# Patient Record
Sex: MALE | Race: WHITE | NOT HISPANIC OR LATINO | Employment: OTHER | URBAN - METROPOLITAN AREA
[De-identification: names, ages, dates, MRNs, and addresses within clinical notes are randomized per-mention and may not be internally consistent; named-entity substitution may affect disease eponyms.]

---

## 2023-07-18 NOTE — H&P (VIEW-ONLY)
Chief Complaint  Congestive Heart Failure    Subjective            Adeel Pruitt presents to [facility] CARDIOLOGY  Congestive Heart Failure  Associated symptoms include chest pain and shortness of breath.     63-year-old white male.  He has a history of cardiomyopathy, he is somewhat of a difficult historian but it sounds like it was diagnosed a few years ago.  He had some early diagnostic work-up in [place].  His ejection fraction was 20 to 25%.  He is only on a beta-blocker and Lasix.  He has had increased shortness of breath and some tightness in his chest with exertion.  Based on the records I have and discussing with the patient he has never had coronary angiography.  He was a former heavy drinker long-term and has been a heavy smoker long-term.    PMH  Past Medical History:   Diagnosis Date    CHF (congestive heart failure)     Hypertension          SURGICALHX  History reviewed. No pertinent surgical history.     SOC  Social History     Socioeconomic History    Marital status: Single   Tobacco Use    Smoking status: Every Day     Types: Cigarettes    Smokeless tobacco: Current     Types: Chew   Vaping Use    Vaping Use: Never used   Substance and Sexual Activity    Alcohol use: Yes     Comment: social    Drug use: Yes     Types: Marijuana, Cocaine(coke)    Sexual activity: Defer         FAMHX  Family History   Problem Relation Age of Onset    No Known Problems Mother     Hypertension Father           ALLERGY  No Known Allergies     MEDSCURRENT    Current Outpatient Medications:     cephalexin (KEFLEX) 500 MG capsule, Take 1 capsule by mouth 2 (Two) Times a Day., Disp: , Rfl:     doxycycline (VIBRAMYCIN) 100 MG capsule, Take 1 capsule by mouth Daily., Disp: , Rfl:     furosemide (LASIX) 40 MG tablet, Take 1 tablet by mouth 2 (Two) Times a Day., Disp: , Rfl:     metoprolol succinate XL (TOPROL-XL) 100 MG 24 hr tablet, Take 1 tablet by mouth Daily. 1/2 tab, Disp: , Rfl:     naproxen (NAPROSYN)  "500 MG tablet, Take 1 tablet by mouth 2 (Two) Times a Day With Meals., Disp: , Rfl:     sulfamethoxazole-trimethoprim (BACTRIM DS,SEPTRA DS) 800-160 MG per tablet, Take 1 tablet by mouth 2 (Two) Times a Day., Disp: , Rfl:     aspirin 81 MG EC tablet, Take 1 tablet by mouth Daily., Disp: 99 tablet, Rfl: 99    sacubitril-valsartan (Entresto) 24-26 MG tablet, Take 1 tablet by mouth 2 (Two) Times a Day., Disp: 180 tablet, Rfl: 3      Review of Systems   Constitutional: Positive for malaise/fatigue.   HENT: Negative.     Eyes: Negative.    Cardiovascular:  Positive for chest pain and dyspnea on exertion.   Respiratory:  Positive for shortness of breath.    Endocrine: Negative.    Hematologic/Lymphatic: Negative.    Skin: Negative.    Musculoskeletal: Negative.    Gastrointestinal: Negative.    Genitourinary: Negative.    Neurological: Negative.    Psychiatric/Behavioral: Negative.        Objective     /84   Pulse 79   Ht 172.7 cm (68\")   Wt 118 kg (260 lb)   BMI 39.53 kg/m²       General Appearance:   well developed  well nourished, obese  HENT:   oropharynx moist  lips not cyanotic  Neck:  thyroid not enlarged  supple  Respiratory:  no respiratory distress  normal breath sounds  no rales  Cardiovascular:  no jugular venous distention  regular rhythm  apical impulse normal  S1 normal, S2 normal  no S3, no S4   no murmur  no rub, no thrill  carotid pulses normal; no bruit  pedal pulses normal  lower extremity edema: none    Musculoskeletal:  no clubbing of fingers.   normocephalic, head atraumatic  Skin:   warm, dry  Psychiatric:  judgement and insight appropriate  normal mood and affect      Result Review :             Data reviewed : Previous cardiology records reviewed, echo reviewed last month ejection fraction 20 to 25% with mild mitral regurgitation, creatinine 0.9        ECG 12 Lead    Date/Time: 7/18/2023 3:51 PM  Performed by: CHRISTOPHER Conner MD  Authorized by: CHRISTOPHER Conner MD   Comparison: not " compared with previous ECG   Previous ECG: no previous ECG available  Rhythm: sinus rhythm  Conduction: left bundle branch block  ST Segments: ST segments normal  T Waves: T waves normal  QRS axis: normal  Other: no other findings    Clinical impression: abnormal EKG          Adeel Pruitt  reports that he has been smoking cigarettes. His smokeless tobacco use includes chew.. I have educated him on the risk of diseases from using tobacco products such as cancer, COPD, and heart disease.     I advised him to quit and he is willing to quit. We have discussed the following method/s for tobacco cessation:  Counseling.  Together we have set a quit date for  his down to 0 cigarettes .  He will follow up with me in several months or sooner to check on his progress.    I spent 3  minutes counseling the patient.                Assessment and Plan        ASSESSMENT:  Encounter Diagnoses   Name Primary?    Severe left ventricular systolic dysfunction (LVSD) Yes    Hypertension, essential     Morbid (severe) obesity due to excess calories     Smoker          PLAN:    1.  Severe left ventricular dysfunction-etiology is unclear.  Suspect probably nonischemic cardiomyopathy given the chronicity but the patient has never had coronary angiography as best I can tell.  Continue the beta-blocker.  He is symptomatic.  I am adding low-dose Entresto and this will be titrated accordingly.  Eventually I would prefer to get him on spironolactone as well as an SGLT2 inhibitor.  Cardiac catheterization will be scheduled next week and office follow-up thereafter.  His baseline EKG shows a left bundle branch block, once we optimize his medical therapy and reassess his ejection fraction will consider a CRT-D implant long-term with further follow-up and discussion with the patient  2.  Essential hypertension-stable, adding Entresto today  3.  Morbid obesity due to excess calories-counseled  4.  Smoker-counseled    Follow-up will be arranged after  angiography          Patient was given instructions and counseling regarding his condition or for health maintenance advice. Please see specific information pulled into the AVS if appropriate.             C Jude Conner MD  7/18/2023    15:50 EDT

## 2023-07-19 PROBLEM — I51.9 SEVERE LEFT VENTRICULAR SYSTOLIC DYSFUNCTION (LVSD): Status: ACTIVE | Noted: 2023-07-19

## 2023-07-24 NOTE — PRE-PROCEDURE INSTRUCTIONS
PATIENT INSTRUCTED TO BE:    - NPO AFTER MIDNIGHT EXCEPT CAN HAVE SIPS OF WATER WITH HIS MEDICATION PRIOR TO PROCEDURE NO CHEWING AFTER MIDNIGHT    -  INSTRUCTED NO LOTIONS, JEWELRY, PIERCINGS, OR DEODORANT DAY OF THE PROCEDURE    - INSTRUCTED TO TAKE THE FOLLOWING MEDICATIONS THE DAY OF SURGERY:     ASA, FUROSEMIDE, METOPROLOL, ENTRESTO      - INSTRUCTED PT TO FOLLOW ANY INSTRUCTIONS GIVEN BY HIS CARDIOLOGIST.    - INFORMED PT THEY ATTEMPT RADIAL APPROACH FIRST IF UNABLE TO PERFORM CATH WITH THAT APPROACH THEY WILL DO A FEMORAL APPROACH.  ALSO, INFORMED PT THEY WILL BE ON BEDREST POSTOP.  IF THE SURGEON FEELS  AN INTERVENTION OR STENTS NEEDS TO BE PLACED, HE/SHE WILL STAY OVER NIGHT FOR OBSERVATION AND MONITORING.     - INFORMED THE PATIENT HE CAN HAVE TWO VISITORS WITH HIM THE DAY OF THE PROCEDURE    - INSTRUCTED PT TO PARK IN THE PARKING GARAGE, ENTER THE HOSPITAL THRU ENTRANCE A AND  CHECK IN AT THE MAIN REGISTRATION DESK, AFTER REGISTRATION PT WILL BE TAKEN THE THE PREOP AREA FOR HIS PROCEDURE.    -ARRIVAL TIME GIVEN BY CARDIOLOGIST OFFICE, INFORMED PT IF ARRIVAL TIME CHANGES OR ADJUSTMENTS NEED TO BE MADE IN THEIR ARRIVAL TIME, HE/SHE WOULD RECEIVE A CALL.      - PATIENT VERBALIZED UNDERSTANDING    NO SMOKING 24 HOURS PRIOR TO PROCEDURE

## 2023-07-27 ENCOUNTER — HOSPITAL ENCOUNTER (OUTPATIENT)
Facility: HOSPITAL | Age: 63
Setting detail: HOSPITAL OUTPATIENT SURGERY
Discharge: HOME | End: 2023-07-27
Attending: INTERNAL MEDICINE | Admitting: INTERNAL MEDICINE
Payer: COMMERCIAL

## 2023-07-27 VITALS
RESPIRATION RATE: 16 BRPM | WEIGHT: 260 LBS | HEIGHT: 68 IN | TEMPERATURE: 98.4 F | BODY MASS INDEX: 39.4 KG/M2 | HEART RATE: 74 BPM | DIASTOLIC BLOOD PRESSURE: 87 MMHG | OXYGEN SATURATION: 96 % | SYSTOLIC BLOOD PRESSURE: 122 MMHG

## 2023-07-27 DIAGNOSIS — I51.9 SEVERE LEFT VENTRICULAR SYSTOLIC DYSFUNCTION (LVSD): ICD-10-CM

## 2023-07-27 LAB
ANION GAP SERPL CALCULATED.3IONS-SCNC: 9.6 MMOL/L (ref 5–15)
BUN SERPL-MCNC: 13 MG/DL (ref 8–23)
BUN/CREAT SERPL: 12.3 (ref 7–25)
CALCIUM SPEC-SCNC: 8.9 MG/DL (ref 8.6–10.5)
CHLORIDE SERPL-SCNC: 99 MMOL/L (ref 98–107)
CO2 SERPL-SCNC: 29.4 MMOL/L (ref 22–29)
CREAT SERPL-MCNC: 1.06 MG/DL (ref 0.76–1.27)
DEPRECATED RDW RBC AUTO: 45.2 FL (ref 37–54)
EGFRCR SERPLBLD CKD-EPI 2021: 79.8 ML/MIN/1.73
ERYTHROCYTE [DISTWIDTH] IN BLOOD BY AUTOMATED COUNT: 14.6 % (ref 12.3–15.4)
GLUCOSE SERPL-MCNC: 113 MG/DL (ref 65–99)
HCT VFR BLD AUTO: 44.1 % (ref 37.5–51)
HGB BLD-MCNC: 14.7 G/DL (ref 13–17.7)
INR PPP: 0.93 (ref 0.86–1.15)
MCH RBC QN AUTO: 29.3 PG (ref 26.6–33)
MCHC RBC AUTO-ENTMCNC: 33.3 G/DL (ref 31.5–35.7)
MCV RBC AUTO: 88 FL (ref 79–97)
PLATELET # BLD AUTO: 272 10*3/MM3 (ref 140–450)
PMV BLD AUTO: 9.2 FL (ref 6–12)
POTASSIUM SERPL-SCNC: 4.1 MMOL/L (ref 3.5–5.2)
PROTHROMBIN TIME: 12.6 SECONDS (ref 11.8–14.9)
RBC # BLD AUTO: 5.01 10*6/MM3 (ref 4.14–5.8)
SODIUM SERPL-SCNC: 138 MMOL/L (ref 136–145)
WBC NRBC COR # BLD: 9.34 10*3/MM3 (ref 3.4–10.8)

## 2023-07-27 PROCEDURE — 25010000002 FENTANYL CITRATE (PF) 50 MCG/ML SOLUTION: Performed by: INTERNAL MEDICINE

## 2023-07-27 PROCEDURE — C1769 GUIDE WIRE: HCPCS | Performed by: INTERNAL MEDICINE

## 2023-07-27 PROCEDURE — 25510000001 IOPAMIDOL PER 1 ML: Performed by: INTERNAL MEDICINE

## 2023-07-27 PROCEDURE — 93458 L HRT ARTERY/VENTRICLE ANGIO: CPT | Performed by: INTERNAL MEDICINE

## 2023-07-27 PROCEDURE — 99152 MOD SED SAME PHYS/QHP 5/>YRS: CPT | Performed by: INTERNAL MEDICINE

## 2023-07-27 PROCEDURE — 25010000002 MIDAZOLAM PER 1MG: Performed by: INTERNAL MEDICINE

## 2023-07-27 PROCEDURE — 25010000002 HEPARIN (PORCINE) PER 1000 UNITS: Performed by: INTERNAL MEDICINE

## 2023-07-27 PROCEDURE — 85610 PROTHROMBIN TIME: CPT | Performed by: INTERNAL MEDICINE

## 2023-07-27 PROCEDURE — 85027 COMPLETE CBC AUTOMATED: CPT | Performed by: INTERNAL MEDICINE

## 2023-07-27 PROCEDURE — C1894 INTRO/SHEATH, NON-LASER: HCPCS | Performed by: INTERNAL MEDICINE

## 2023-07-27 PROCEDURE — 80048 BASIC METABOLIC PNL TOTAL CA: CPT | Performed by: INTERNAL MEDICINE

## 2023-07-27 RX ORDER — MIDAZOLAM HYDROCHLORIDE 2 MG/2ML
INJECTION, SOLUTION INTRAMUSCULAR; INTRAVENOUS
Status: DISCONTINUED | OUTPATIENT
Start: 2023-07-27 | End: 2023-07-27 | Stop reason: HOSPADM

## 2023-07-27 RX ORDER — ACETAMINOPHEN 325 MG/1
650 TABLET ORAL EVERY 4 HOURS PRN
Status: DISCONTINUED | OUTPATIENT
Start: 2023-07-27 | End: 2023-07-27 | Stop reason: HOSPADM

## 2023-07-27 RX ORDER — NITROGLYCERIN 0.4 MG/1
0.4 TABLET SUBLINGUAL
Status: DISCONTINUED | OUTPATIENT
Start: 2023-07-27 | End: 2023-07-27 | Stop reason: HOSPADM

## 2023-07-27 RX ORDER — VERAPAMIL HYDROCHLORIDE 2.5 MG/ML
INJECTION, SOLUTION INTRAVENOUS
Status: DISCONTINUED | OUTPATIENT
Start: 2023-07-27 | End: 2023-07-27 | Stop reason: HOSPADM

## 2023-07-27 RX ORDER — SODIUM CHLORIDE 9 MG/ML
40 INJECTION, SOLUTION INTRAVENOUS AS NEEDED
Status: DISCONTINUED | OUTPATIENT
Start: 2023-07-27 | End: 2023-07-27 | Stop reason: HOSPADM

## 2023-07-27 RX ORDER — LIDOCAINE HYDROCHLORIDE 20 MG/ML
INJECTION, SOLUTION INFILTRATION; PERINEURAL
Status: DISCONTINUED | OUTPATIENT
Start: 2023-07-27 | End: 2023-07-27 | Stop reason: HOSPADM

## 2023-07-27 RX ORDER — FENTANYL CITRATE 50 UG/ML
INJECTION, SOLUTION INTRAMUSCULAR; INTRAVENOUS
Status: DISCONTINUED | OUTPATIENT
Start: 2023-07-27 | End: 2023-07-27 | Stop reason: HOSPADM

## 2023-07-27 RX ORDER — HEPARIN SODIUM 1000 [USP'U]/ML
INJECTION, SOLUTION INTRAVENOUS; SUBCUTANEOUS
Status: DISCONTINUED | OUTPATIENT
Start: 2023-07-27 | End: 2023-07-27 | Stop reason: HOSPADM

## 2023-07-27 RX ORDER — SODIUM CHLORIDE 0.9 % (FLUSH) 0.9 %
10 SYRINGE (ML) INJECTION EVERY 12 HOURS SCHEDULED
Status: DISCONTINUED | OUTPATIENT
Start: 2023-07-27 | End: 2023-07-27 | Stop reason: HOSPADM

## 2023-07-27 RX ORDER — SODIUM CHLORIDE 0.9 % (FLUSH) 0.9 %
10 SYRINGE (ML) INJECTION AS NEEDED
Status: DISCONTINUED | OUTPATIENT
Start: 2023-07-27 | End: 2023-07-27 | Stop reason: HOSPADM

## 2023-07-27 RX ORDER — SACUBITRIL AND VALSARTAN 24; 26 MG/1; MG/1
2 TABLET, FILM COATED ORAL 2 TIMES DAILY
Qty: 180 TABLET | Refills: 3 | Status: SHIPPED | OUTPATIENT
Start: 2023-07-27

## 2023-07-27 NOTE — NURSING NOTE
Patient came back from procedure stable. Patient was recovered per order. No signs of bleeding or swelling at procedure site. VSS. Patient and spouse educated on discharge instructions. Patient and spouse verbalized understanding. IV removed.

## 2023-08-09 ENCOUNTER — OFFICE VISIT (OUTPATIENT)
Dept: CARDIOLOGY | Facility: CLINIC | Age: 63
End: 2023-08-09
Payer: COMMERCIAL

## 2023-08-09 VITALS
DIASTOLIC BLOOD PRESSURE: 92 MMHG | HEART RATE: 93 BPM | HEIGHT: 68 IN | BODY MASS INDEX: 39.4 KG/M2 | WEIGHT: 260 LBS | SYSTOLIC BLOOD PRESSURE: 132 MMHG

## 2023-08-09 DIAGNOSIS — F17.200 SMOKER: ICD-10-CM

## 2023-08-09 DIAGNOSIS — E66.01 MORBID (SEVERE) OBESITY DUE TO EXCESS CALORIES: ICD-10-CM

## 2023-08-09 DIAGNOSIS — I10 HYPERTENSION, ESSENTIAL: ICD-10-CM

## 2023-08-09 DIAGNOSIS — I51.9 SEVERE LEFT VENTRICULAR SYSTOLIC DYSFUNCTION (LVSD): ICD-10-CM

## 2023-08-09 DIAGNOSIS — I42.0 CARDIOMYOPATHY, DILATED: Primary | ICD-10-CM

## 2023-08-09 RX ORDER — FUROSEMIDE 40 MG/1
40 TABLET ORAL 2 TIMES DAILY
Qty: 180 TABLET | Refills: 3 | Status: SHIPPED | OUTPATIENT
Start: 2023-08-09

## 2023-08-09 RX ORDER — METOPROLOL SUCCINATE 100 MG/1
100 TABLET, EXTENDED RELEASE ORAL DAILY
Qty: 90 TABLET | Refills: 3 | Status: SHIPPED | OUTPATIENT
Start: 2023-08-09

## 2023-08-09 RX ORDER — SPIRONOLACTONE 25 MG/1
25 TABLET ORAL DAILY
Qty: 90 TABLET | Refills: 3 | Status: SHIPPED | OUTPATIENT
Start: 2023-08-09

## 2023-08-09 NOTE — PROGRESS NOTES
Chief Complaint  severe left ventricular systolic dysfunction    Sherie Pruitt presents to [facility] CARDIOLOGY  History of Present Illness    Mr. Pruitt is here for follow-up evaluation management of dilated cardiomyopathy, essential hypertension, dyspnea, smoker.  His ejection fraction was recently 20 to 25%.  Subsequent cardiac catheterization showed minimal nonobstructive coronary artery disease, severe dilated cardiomyopathy ejection fraction 20%.  He denies chest pain.  He has shortness of breath with exertion which is stable as well as lower extremity edema.  He denies fluid weight gain.  He is compliant with his medical therapy.    PMH  Past Medical History:   Diagnosis Date    Cardiomyopathy     CHF (congestive heart failure)     [place] (hard of hearing)     Hypertension     Shortness of breath     REPORTS ISSUES AT REST AT TIMES AND WITH EXERTION         SURGICALHX  Past Surgical History:   Procedure Laterality Date    ANKLE SURGERY Left     HARDWARE PLACED    CARDIAC CATHETERIZATION Left 7/27/2023    Procedure: Left Heart Cath - radial;  Surgeon: CHRISTOPHER Conner MD;  Location: [facility] CATH INVASIVE LOCATION;  Service: Cardiology;  Laterality: Left;        SOC  Social History     Socioeconomic History    Marital status: Single   Tobacco Use    Smoking status: Every Day     Types: Cigarettes    Smokeless tobacco: Current     Types: Chew   Vaping Use    Vaping Use: Never used   Substance and Sexual Activity    Alcohol use: Yes     Comment: social    Drug use: Yes     Types: Marijuana, Cocaine(coke)     Comment: REPORTS HAS BEEN MONTH SINCE USED COCAINE, LAST MARIJUANA COUPLE OF DAYS AGO    Sexual activity: Defer         FAMHX  Family History   Problem Relation Age of Onset    No Known Problems Mother     Hypertension Father           ALLERGY  No Known Allergies     MEDSCURRENT    Current Outpatient Medications:     aspirin 81 MG EC tablet, Take 1 tablet by mouth Daily.,  "Disp: 99 tablet, Rfl: 99    furosemide (LASIX) 40 MG tablet, Take 1 tablet by mouth 2 (Two) Times a Day., Disp: 180 tablet, Rfl: 3    metoprolol succinate XL (TOPROL-XL) 100 MG 24 hr tablet, Take 1 tablet by mouth Daily., Disp: 90 tablet, Rfl: 3    sacubitril-valsartan (Entresto) 24-26 MG tablet, Take 2 tablets by mouth 2 (Two) Times a Day., Disp: 180 tablet, Rfl: 3    spironolactone (ALDACTONE) 25 MG tablet, Take 1 tablet by mouth Daily., Disp: 90 tablet, Rfl: 3      Review of Systems   Constitutional: Negative for malaise/fatigue.   Cardiovascular:  Positive for dyspnea on exertion and leg swelling. Negative for chest pain and palpitations.   Respiratory:  Positive for shortness of breath.    Neurological:  Negative for dizziness.      Objective     /92   Pulse 93   Ht 172.7 cm (68\")   Wt 118 kg (260 lb)   BMI 39.53 kg/mý       General Appearance:   well developed  well nourished  HENT:   oropharynx moist  lips not cyanotic  Neck:  thyroid not enlarged  supple  Respiratory:  no respiratory distress  normal breath sounds  no rales  Cardiovascular:  no jugular venous distention  regular rhythm  apical impulse normal  S1 normal, S2 normal  no S3, no S4   no murmur  no rub, no thrill  carotid pulses normal; no bruit  pedal pulses normal  lower extremity edema: Trace  Musculoskeletal:  no clubbing of fingers.   normocephalic, head atraumatic  Skin:   warm, dry  Psychiatric:  judgement and insight appropriate  normal mood and affect      Result Review :     The following data was reviewed by: RIMMA Terrazas on 08/09/2023:    CMP          7/27/2023    10:36   CMP   Glucose 113    BUN 13    Creatinine 1.06    EGFR 79.8    Sodium 138    Potassium 4.1    Chloride 99    Calcium 8.9    BUN/Creatinine Ratio 12.3    Anion Gap 9.6      CBC          7/27/2023    10:01   CBC   WBC 9.34    RBC 5.01    Hemoglobin 14.7    Hematocrit 44.1    MCV 88.0    MCH 29.3    MCHC 33.3    RDW 14.6    Platelets 272  "                Procedures      Adeel Pruitt  reports that he has been smoking cigarettes. His smokeless tobacco use includes chew.. I have educated him on the risk of diseases from using tobacco products such as cancer, COPD, and heart disease.     I advised him to quit and he is not willing to quit.    I spent 3  minutes counseling the patient.                Assessment and Plan        ASSESSMENT:  Encounter Diagnoses   Name Primary?    Severe left ventricular systolic dysfunction (LVSD)     Hypertension, essential     Morbid (severe) obesity due to excess calories     Smoker     Cardiomyopathy, dilated Yes         PLAN:    1.  Dilated cardiomyopathy-ejection fraction severely reduced at 20%.  Coronary angiography showed minimal nonobstructive CAD.  Concerning GDMT.  Continue Entresto.  Increase Toprol.  Add Aldactone.  BMP in 2 weeks.  Volume status is stable.  2.  Essential hypertension-mildly elevated today.  Adjustments as above.  3.  Smoker-counseled as above.    Follow-up in 3 weeks for further GDMT adjustments.  We will maximize medical therapy and then repeat an echocardiogram around 3 months.  At that point if ejection fraction remains severely reduced will consider biventricular ICD implantation.  He is agreeable to this plan.      Patient was given instructions and counseling regarding his condition or for health maintenance advice. Please see specific information pulled into the AVS if appropriate.           Adriana Farr, APRN   8/9/2023  13:12 EDT

## 2023-09-11 ENCOUNTER — OFFICE VISIT (OUTPATIENT)
Dept: CARDIOLOGY | Facility: CLINIC | Age: 63
End: 2023-09-11
Payer: COMMERCIAL

## 2023-09-11 VITALS
WEIGHT: 260 LBS | BODY MASS INDEX: 39.4 KG/M2 | DIASTOLIC BLOOD PRESSURE: 92 MMHG | HEART RATE: 104 BPM | SYSTOLIC BLOOD PRESSURE: 134 MMHG | HEIGHT: 68 IN

## 2023-09-11 DIAGNOSIS — F17.200 SMOKER: ICD-10-CM

## 2023-09-11 DIAGNOSIS — I42.0 CARDIOMYOPATHY, DILATED: Primary | ICD-10-CM

## 2023-09-11 DIAGNOSIS — I10 HYPERTENSION, ESSENTIAL: ICD-10-CM

## 2023-09-11 PROCEDURE — 1159F MED LIST DOCD IN RCRD: CPT | Performed by: NURSE PRACTITIONER

## 2023-09-11 PROCEDURE — 99214 OFFICE O/P EST MOD 30 MIN: CPT | Performed by: NURSE PRACTITIONER

## 2023-09-11 PROCEDURE — 1160F RVW MEDS BY RX/DR IN RCRD: CPT | Performed by: NURSE PRACTITIONER

## 2023-09-11 NOTE — PROGRESS NOTES
Chief Complaint  Cardiomyopathy    Subjective            Adeel Pruitt presents to [facility] CARDIOLOGY  History of Present Illness    Mr. Pruitt is here for follow-up evaluation management of dilated cardiomyopathy, essential hypertension, dyspnea, and smoking.  Ejection fraction 20 to 25%.  Subsequent cardiac catheterization showed minimal nonobstructive coronary artery disease, severe dilated cardiomyopathy, ejection fraction 20%.  He is compliant with guideline directed medical therapy.  He denies chest pain or excessive shortness of breath.  Lower extremity edema is stable.  Compliant medical therapy.    PMH  Past Medical History:   Diagnosis Date    Cardiomyopathy     CHF (congestive heart failure)     [place] (hard of hearing)     Hypertension     Shortness of breath     REPORTS ISSUES AT REST AT TIMES AND WITH EXERTION         SURGICALHX  Past Surgical History:   Procedure Laterality Date    ANKLE SURGERY Left     HARDWARE PLACED    CARDIAC CATHETERIZATION Left 7/27/2023    Procedure: Left Heart Cath - radial;  Surgeon: CHRISTOPHER Conner MD;  Location[facility] CATH INVASIVE LOC[facility];  Service: Cardiology;  Laterality: Left;        SOC  Social History     Socioeconomic History    Marital status: Single   Tobacco Use    Smoking status: Every Day     Types: Cigarettes    Smokeless tobacco: Current     Types: Chew   Vaping Use    Vaping Use: Never used   Substance and Sexual Activity    Alcohol use: Yes     Comment: social    Drug use: Yes     Types: Marijuana, Cocaine(coke)     Comment: REPORTS HAS BEEN MONTH SINCE USED COCAINE, LAST MARIJUANA COUPLE OF DAYS AGO    Sexual activity: Defer         FAMHX  Family History   Problem Relation Age of Onset    No Known Problems Mother     Hypertension Father           ALLERGY  No Known Allergies     MEDSCURRENT    Current Outpatient Medications:     aspirin 81 MG EC tablet, Take 1 tablet by mouth Daily., Disp: 99 tablet, Rfl: 99    furosemide (LASIX) 40 MG  "tablet, Take 1 tablet by mouth 2 (Two) Times a Day., Disp: 180 tablet, Rfl: 3    metoprolol succinate XL (TOPROL-XL) 100 MG 24 hr tablet, Take 1 tablet by mouth Daily., Disp: 90 tablet, Rfl: 3    spironolactone (ALDACTONE) 25 MG tablet, Take 1 tablet by mouth Daily., Disp: 90 tablet, Rfl: 3    dapagliflozin Propanediol 10 MG tablet, Take 10 mg by mouth Daily., Disp: 90 tablet, Rfl: 3    sacubitril-valsartan (ENTRESTO) 49-51 MG tablet, Take 1 tablet by mouth 2 (Two) Times a Day., Disp: 180 tablet, Rfl: 3      Review of Systems   Constitutional: Negative for malaise/fatigue.   Cardiovascular:  Positive for leg swelling and palpitations. Negative for chest pain, dyspnea on exertion, irregular heartbeat, near-syncope, orthopnea and syncope.   Respiratory:  Positive for shortness of breath.    Neurological:  Negative for dizziness.      Objective     /92   Pulse 104   Ht 172.7 cm (68\")   Wt 118 kg (260 lb)   BMI 39.53 kg/m²       General Appearance:   well developed  well nourished  HENT:   oropharynx moist  lips not cyanotic  Neck:  thyroid not enlarged  supple  Respiratory:  no respiratory distress  normal breath sounds  no rales  Cardiovascular:  no jugular venous distention  regular rhythm  apical impulse normal  S1 normal, S2 normal  no S3, no S4   no murmur  no rub, no thrill  carotid pulses normal; no bruit  pedal pulses normal  lower extremity edema: 1+  Musculoskeletal:  no clubbing of fingers.   normocephalic, head atraumatic  Skin:   warm, dry  Psychiatric:  judgement and insight appropriate  normal mood and affect      Result Review :     The following data was reviewed by: RIMMA Terrazas on 09/11/2023:    CMP          7/27/2023    10:36   CMP   Glucose 113    BUN 13    Creatinine 1.06    EGFR 79.8    Sodium 138    Potassium 4.1    Chloride 99    Calcium 8.9    BUN/Creatinine Ratio 12.3    Anion Gap 9.6      CBC          7/27/2023    10:01   CBC   WBC 9.34    RBC 5.01    Hemoglobin 14.7 "    Hematocrit 44.1    MCV 88.0    MCH 29.3    MCHC 33.3    RDW 14.6    Platelets 272            Data reviewed : Cardiology studies as above      Procedures      Adeel Pruitt  reports that he has been smoking cigarettes. His smokeless tobacco use includes chew.. I have educated him on the risk of diseases from using tobacco products such as cancer, COPD, and heart disease.     I advised him to quit and he is not willing to quit.    I spent 3  minutes counseling the patient.                Assessment and Plan        ASSESSMENT:  Encounter Diagnoses   Name Primary?    Cardiomyopathy, dilated Yes    Hypertension, essential     Smoker          PLAN:    1.  Dilated cardiomyopathy-ejection fraction most recently 20%.  Coronary catheterization showed minimal nonobstructive disease.  Increase Entresto to mid dose.  Continue Toprol and Aldactone.  He did not get blood work done after I last saw him, I have reordered a BMP today.  Also will add on Farxiga today.  Volume status is stable.  2.  Essential hypertension-mildly elevated today.  Increasing Entresto.  3.  Smoker-cessation counseling as above.    Follow-up in 6 weeks unless problems arise.  We will maximize guideline directed medical therapy and repeat an echocardiogram 3 months afterwards to follow-up on LV function.      Patient was given instructions and counseling regarding his condition or for health maintenance advice. Please see specific information pulled into the AVS if appropriate.           Adriana Farr, APRN   9/11/2023  11:53 EDT

## 2023-09-27 ENCOUNTER — TELEPHONE (OUTPATIENT)
Dept: CARDIOLOGY | Facility: CLINIC | Age: 63
End: 2023-09-27
Payer: COMMERCIAL

## 2023-09-28 ENCOUNTER — TELEPHONE (OUTPATIENT)
Dept: CARDIOLOGY | Facility: CLINIC | Age: 63
End: 2023-09-28

## 2023-09-28 NOTE — TELEPHONE ENCOUNTER
[facility] received a fax that requires your attention. The document has been indexed to the patient’s chart for your review.      Reason for sending: EXTERNAL MEDICAL RECORD NOTIFICATION     Documents Description: [facility] UPDATED ABHIJEET NEEDED-9.28.23    Name of Sender: [facility]     Date Indexed: 9.28.23

## 2023-11-15 ENCOUNTER — OFFICE VISIT (OUTPATIENT)
Dept: CARDIOLOGY | Facility: CLINIC | Age: 63
End: 2023-11-15
Payer: COMMERCIAL

## 2023-11-15 VITALS
WEIGHT: 260 LBS | DIASTOLIC BLOOD PRESSURE: 73 MMHG | BODY MASS INDEX: 39.4 KG/M2 | HEART RATE: 80 BPM | SYSTOLIC BLOOD PRESSURE: 127 MMHG | HEIGHT: 68 IN

## 2023-11-15 DIAGNOSIS — F17.200 SMOKER: ICD-10-CM

## 2023-11-15 DIAGNOSIS — I10 HYPERTENSION, ESSENTIAL: ICD-10-CM

## 2023-11-15 DIAGNOSIS — I42.0 CARDIOMYOPATHY, DILATED: Primary | ICD-10-CM

## 2023-11-15 NOTE — PROGRESS NOTES
Chief Complaint  Cardiomyopathy and Follow-up (6 week)    Subjective            Adeel Pruitt presents to [facility] CARDIOLOGY  History of Present Illness    Mr. Pruitt is here for follow-up evaluation management of dilated cardiomyopathy, essential hypertension, dyspnea, and smoking.  Cardiac catheterization showed minimal nonobstructive coronary artery disease and severe dilated cardiomyopathy, ejection fraction 20%.  We are working on his GDMT.  Unfortunately he has not got his Farxiga yet, there was a PA in process I am unsure on the status of that.  Otherwise he is compliant with his medical therapy.  He denies chest pain or excessive shortness of breath.  Stable lower extremity edema.  He has dizziness with standing.    PMH  Past Medical History:   Diagnosis Date    Cardiomyopathy     CHF (congestive heart failure)     [place] (hard of hearing)     Hypertension     Shortness of breath     REPORTS ISSUES AT REST AT TIMES AND WITH EXERTION         SURGICALHX  Past Surgical History:   Procedure Laterality Date    ANKLE SURGERY Left     HARDWARE PLACED    CARDIAC CATHETERIZATION Left 7/27/2023    Procedure: Left Heart Cath - radial;  Surgeon: CHRISTOPHER Conner MD;  Location: [facility] CATH INVASIVE LOCATION;  Service: Cardiology;  Laterality: Left;        SOC  Social History     Socioeconomic History    Marital status: Single   Tobacco Use    Smoking status: Every Day     Types: Cigarettes    Smokeless tobacco: Current     Types: Chew   Vaping Use    Vaping Use: Never used   Substance and Sexual Activity    Alcohol use: Yes     Comment: social    Drug use: Yes     Types: Marijuana, Cocaine(coke)     Comment: REPORTS HAS BEEN MONTH SINCE USED COCAINE, LAST MARIJUANA COUPLE OF DAYS AGO    Sexual activity: Defer         FAMHX  Family History   Problem Relation Age of Onset    No Known Problems Mother     Hypertension Father           ALLERGY  No Known Allergies     MEDSCURRENT    Current Outpatient  "Medications:     aspirin 81 MG EC tablet, Take 1 tablet by mouth Daily., Disp: 99 tablet, Rfl: 99    dapagliflozin Propanediol 10 MG tablet, Take 10 mg by mouth Daily., Disp: 90 tablet, Rfl: 3    furosemide (LASIX) 40 MG tablet, Take 1 tablet by mouth 2 (Two) Times a Day., Disp: 180 tablet, Rfl: 3    metoprolol succinate XL (TOPROL-XL) 100 MG 24 hr tablet, Take 1 tablet by mouth Daily., Disp: 90 tablet, Rfl: 3    sacubitril-valsartan (ENTRESTO) 49-51 MG tablet, Take 1 tablet by mouth 2 (Two) Times a Day., Disp: 180 tablet, Rfl: 3    spironolactone (ALDACTONE) 25 MG tablet, Take 1 tablet by mouth Daily., Disp: 90 tablet, Rfl: 3      Review of Systems   Constitutional: Negative for malaise/fatigue.   Cardiovascular:  Negative for chest pain, dyspnea on exertion, irregular heartbeat, near-syncope, orthopnea and syncope.   Respiratory:  Negative for shortness of breath.         Objective     /73   Pulse 80   Ht 172.7 cm (68\")   Wt 118 kg (260 lb)   BMI 39.53 kg/m²       General Appearance:   well developed  well nourished  HENT:   oropharynx moist  lips not cyanotic  Neck:  thyroid not enlarged  supple  Respiratory:  no respiratory distress  normal breath sounds  no rales  Cardiovascular:  no jugular venous distention  regular rhythm  apical impulse normal  S1 normal, S2 normal  no S3, no S4   no murmur  no rub, no thrill  carotid pulses normal; no bruit  pedal pulses normal  lower extremity edema: Trace  Musculoskeletal:  no clubbing of fingers.   normocephalic, head atraumatic  Skin:   warm, dry  Psychiatric:  judgement and insight appropriate  normal mood and affect      Result Review :     The following data was reviewed by: RIMMA Terrazas on 11/15/2023:    CMP          7/27/2023    10:36   CMP   Glucose 113    BUN 13    Creatinine 1.06    EGFR 79.8    Sodium 138    Potassium 4.1    Chloride 99    Calcium 8.9    BUN/Creatinine Ratio 12.3    Anion Gap 9.6      CBC          7/27/2023    10:01 "   CBC   WBC 9.34    RBC 5.01    Hemoglobin 14.7    Hematocrit 44.1    MCV 88.0    MCH 29.3    MCHC 33.3    RDW 14.6    Platelets 272        Data reviewed : Cardiology studies above      Procedures      Adeel Pruitt  reports that he has been smoking cigarettes. His smokeless tobacco use includes chew.. I have educated him on the risk of diseases from using tobacco products such as cancer, COPD, and heart disease.     I advised him to quit and he is not willing to quit.    I spent 3  minutes counseling the patient.                Assessment and Plan        ASSESSMENT:  Encounter Diagnoses   Name Primary?    Cardiomyopathy, dilated Yes    Hypertension, essential     Smoker          PLAN:    1.  Dilated cardiomyopathy-ejection fraction 20%.  Coronary catheterization showed minimal nonobstructive disease.  I am going to check on the status of the Farxiga PA.  He will start once this is completed.  Otherwise continue Entresto, Toprol, and Aldactone.  We will give him about 3 months on this regimen and then repeat an echo to reevaluate LV function.  If EF remains less than 35%, will discuss primary prevention ICD.   2.  Essential hypertension-controlled, continue current medical therapy.  3.  Smoker not currently interested in quitting.    Follow-up after echo.      Patient was given instructions and counseling regarding his condition or for health maintenance advice. Please see specific information pulled into the AVS if appropriate.           Adriana Farr, APRN   11/15/2023  10:55 EST

## 2023-11-16 ENCOUNTER — TELEPHONE (OUTPATIENT)
Dept: CARDIOLOGY | Facility: CLINIC | Age: 63
End: 2023-11-16
Payer: COMMERCIAL

## 2023-11-16 NOTE — TELEPHONE ENCOUNTER
CALLED [place] [facility] TO CHECK STATUS OF Located within [facility] PAP.  WAS ADVISED NO PT APPLICATION WAS EVER RECEIVED.  ONLY A PRESCRIPTION WAS RECEIVED.  PT NEEDS TO COMPLETE REST OF APPLICATION AND SUBMIT FOR REVIEW.  TRIED TO CONTACT PT SEVERAL TIMES WITH NO RESULTS.  LEFT VM FOR PT TO RETURN CALL.

## 2023-12-13 ENCOUNTER — TELEPHONE (OUTPATIENT)
Dept: CARDIOLOGY | Facility: CLINIC | Age: 63
End: 2023-12-13
Payer: COMMERCIAL

## 2023-12-13 NOTE — TELEPHONE ENCOUNTER
Procedure: Colonoscopy and/or EGD     Med Directive: NA     PMH: Dilated Cardiomyopathy, nonobstructive CAD, HTN     Last Seen: 11/15/23

## 2024-08-26 RX ORDER — SACUBITRIL AND VALSARTAN 49; 51 MG/1; MG/1
1 TABLET, FILM COATED ORAL 2 TIMES DAILY
Qty: 180 TABLET | Refills: 3 | OUTPATIENT
Start: 2024-08-26

## 2024-08-26 RX ORDER — METOPROLOL SUCCINATE 100 MG/1
100 TABLET, EXTENDED RELEASE ORAL DAILY
Qty: 90 TABLET | Refills: 3 | OUTPATIENT
Start: 2024-08-26

## 2024-08-26 RX ORDER — FUROSEMIDE 40 MG
40 TABLET ORAL 2 TIMES DAILY
Qty: 180 TABLET | Refills: 3 | OUTPATIENT
Start: 2024-08-26
